# Patient Record
Sex: FEMALE | Race: WHITE
[De-identification: names, ages, dates, MRNs, and addresses within clinical notes are randomized per-mention and may not be internally consistent; named-entity substitution may affect disease eponyms.]

---

## 2018-02-13 ENCOUNTER — HOSPITAL ENCOUNTER (EMERGENCY)
Dept: HOSPITAL 42 - ED | Age: 33
Discharge: HOME | End: 2018-02-13
Payer: COMMERCIAL

## 2018-02-13 VITALS
RESPIRATION RATE: 18 BRPM | TEMPERATURE: 99.2 F | SYSTOLIC BLOOD PRESSURE: 96 MMHG | OXYGEN SATURATION: 98 % | HEART RATE: 97 BPM | DIASTOLIC BLOOD PRESSURE: 54 MMHG

## 2018-02-13 VITALS — BODY MASS INDEX: 28.5 KG/M2

## 2018-02-13 DIAGNOSIS — J11.1: Primary | ICD-10-CM

## 2018-02-13 NOTE — ED PDOC
Arrival/HPI





- General


Chief Complaint: Cough, Cold, Congestion


Time Seen by Provider: 18 05:10


Historian: Patient





- History of Present Illness


Narrative History of Present Illness (Text): 





18 05:26


33 year old female, with no significant past medical history, presents to the 

emergency department complaining of flu-like symptoms that began a few days 

ago. Patient reports having a dry cough, runny nose, body aches, and subjective 

fever. Patient denies any chills, chest pain, shortness of breath, nausea, 

vomiting, diarrhea, back pain, neck pain, headache, dizziness, or any other 

complaints. 








Time/Duration: Other (few days)


Symptom Onset: Gradual


Symptom Course: Unchanged


Activities at Onset: Light


Context: Home





Past Medical History





- Provider Review


Nursing Documentation Reviewed: Yes





- Cardiac


Hx Cardiac Disorders: No





- Pulmonary


Hx Respiratory Disorders: No





- Neurological


Hx Neurological Disorder: No





- HEENT


Hx HEENT Disorder: No





- Renal


Hx Renal Disorder: No





- Endocrine/Metabolic


Hx Endocrine Disorders: No





- Hematological/Oncological


Hx Blood Disorders: No





- Integumentary


Hx Dermatological Disorder: No





- Musculoskeletal/Rheumatological


Hx Musculoskeletal Disorders: No





- Gastrointestinal


Hx Gastrointestinal Disorders: No





- Genitourinary/Gynecological


Hx Genitourinary Disorders: No





- Psychiatric


Hx Anxiety: Yes


Hx Substance Use: No





- Surgical History


Hx  Section: Yes


Other/Comment: lump on left breast.





- Anesthesia


Hx Anesthesia: No


Hx Anesthesia Reactions: No


Hx Malignant Hyperthermia: No





- Suicidal Assessment


Feels Threatened In Home Enviroment: No





Family/Social History





- Physician Review


Nursing Documentation Reviewed: Yes


Family/Social History: No Known Family HX


Smoking Status: Never Smoked


Hx Alcohol Use: No


Hx Substance Use: No


Hx Substance Use Treatment: No





Allergies/Home Meds


Allergies/Adverse Reactions: 


Allergies





No Known Allergies Allergy (Verified 18 04:53)


 











Review of Systems





- Physician Review


All systems were reviewed & negative as marked: Yes





- Review of Systems


Constitutional: Fevers.  absent: Other (Chills)


ENT: Rhinorrhea


Respiratory: Cough.  absent: SOB


Cardiovascular: absent: Chest Pain


Gastrointestinal: absent: Diarrhea, Nausea, Vomiting


Musculoskeletal: Myalgias.  absent: Back Pain, Neck Pain


Neurological: absent: Headache, Dizziness





Physical Exam


Vital Signs Reviewed: Yes


Vital Signs











  Temp Pulse Resp BP Pulse Ox


 


 18 04:54  99.2 F  97 H  18  96/54 L  98











Temperature: Afebrile


Blood Pressure: Normal


Pulse: Regular


Respiratory Rate: Normal


Appearance: Positive for: Well-Appearing, Non-Toxic, Comfortable


Pain Distress: None


Mental Status: Positive for: Alert and Oriented X 3





- Systems Exam


Head: Present: Atraumatic, Normocephalic


Pupils: Present: PERRL


Extroacular Muscles: Present: EOMI


Conjunctiva: Present: Normal


Mouth: Present: Moist Mucous Membranes


Nose (Internal): Present: Rhinorrhea


Neck: Present: Normal Range of Motion.  No: Meningeal Signs


Respiratory/Chest: Present: Clear to Auscultation, Good Air Exchange.  No: 

Respiratory Distress, Accessory Muscle Use


Cardiovascular: Present: Regular Rate and Rhythm, Normal S1, S2.  No: Murmurs


Abdomen: Present: Normal Bowel Sounds.  No: Tenderness, Distention, Peritoneal 

Signs


Back: Present: Normal Inspection


Upper Extremity: Present: Normal Inspection.  No: Cyanosis, Edema


Lower Extremity: Present: Normal Inspection.  No: Edema


Neurological: Present: GCS=15, CN II-XII Intact, Speech Normal


Skin: Present: Warm, Dry, Normal Color.  No: Rashes


Psychiatric: Present: Alert, Oriented x 3, Normal Insight, Normal Concentration





Medical Decision Making


ED Course and Treatment: 





18 05:26


Impression:


33 year old female presents complaining of flu-like symptoms. Pt complaining of 

dry cough, rhinorrhea, subjective fever, and myalgia. 





Plan:


-- Tamiflu


-- Zithromax 


-- Reassess and disposition





Progress Notes:











- Medication Orders


Current Medication Orders: 











Discontinued Medications





Azithromycin (Zithromax)  500 mg PO ONCE STA


   PRN Reason: Protocol


   Stop: 18 05:31


Oseltamivir Phosphate (Tamiflu Cap)  75 mg PO ONCE ONE


   PRN Reason: Protocol


   Stop: 18 05:31











- Scribe Statement


The provider has reviewed the documentation as recorded by the Betsy Quesada





Provider Scribe Attestation:


All medical record entries made by the Scribe were at my direction and 

personally dictated by me. I have reviewed the chart and agree that the record 

accurately reflects my personal performance of the history, physical exam, 

medical decision making, and the department course for this patient. I have 

also personally directed, reviewed, and agree with the discharge instructions 

and disposition.








Disposition/Present on Arrival





- Present on Arrival


Any Indicators Present on Arrival: No


History of DVT/PE: No


History of Uncontrolled Diabetes: No


Urinary Catheter: No


History of Decub. Ulcer: No


History Surgical Site Infection Following: None





- Disposition


Have Diagnosis and Disposition been Completed?: Yes


Diagnosis: 


 Flu-like symptoms, Bronchitis





Disposition: HOME/ ROUTINE


Disposition Time: 05:32


Patient Plan: Discharge


Patient Problems: 


 Current Active Problems











Problem Status Onset


 


Bronchitis Acute  


 


Flu-like symptoms Acute  











Condition: GOOD


Discharge Instructions (ExitCare):  Influenza (ED), Acute Bronchitis (ED)


Additional Instructions: 


Rest/drink plenty of liquids/take meds as prescribed/follow up with your doctor 

this week


Prescriptions: 


Oseltamivir [Tamiflu] 75 mg PO BID #10 cap


Azithromycin [Zithromax] 250 mg PO DAILY #4 tab


Forms:  [facility] or [facility] (English)

## 2018-12-31 ENCOUNTER — HOSPITAL ENCOUNTER (INPATIENT)
Dept: HOSPITAL 14 - H.L&D | Age: 33
LOS: 3 days | Discharge: HOME | DRG: 371 | End: 2019-01-03
Attending: OBSTETRICS & GYNECOLOGY | Admitting: OBSTETRICS & GYNECOLOGY
Payer: COMMERCIAL

## 2018-12-31 VITALS — RESPIRATION RATE: 18 BRPM

## 2018-12-31 VITALS — BODY MASS INDEX: 35 KG/M2

## 2018-12-31 DIAGNOSIS — O34.211: Primary | ICD-10-CM

## 2018-12-31 DIAGNOSIS — O48.0: ICD-10-CM

## 2018-12-31 DIAGNOSIS — Z3A.40: ICD-10-CM

## 2018-12-31 LAB
BASOPHILS # BLD AUTO: 0 K/UL (ref 0–0.2)
BASOPHILS NFR BLD: 0.3 % (ref 0–2)
EOSINOPHIL # BLD AUTO: 0.1 K/UL (ref 0–0.7)
EOSINOPHIL NFR BLD: 0.5 % (ref 0–4)
ERYTHROCYTE [DISTWIDTH] IN BLOOD BY AUTOMATED COUNT: 14.8 % (ref 11.5–14.5)
HGB BLD-MCNC: 11.6 G/DL (ref 12–16)
LYMPHOCYTES # BLD AUTO: 1.6 K/UL (ref 1–4.3)
LYMPHOCYTES NFR BLD AUTO: 17.3 % (ref 20–40)
MCH RBC QN AUTO: 29.4 PG (ref 27–31)
MCHC RBC AUTO-ENTMCNC: 33.4 G/DL (ref 33–37)
MCV RBC AUTO: 87.8 FL (ref 81–99)
MONOCYTES # BLD: 0.7 K/UL (ref 0–0.8)
MONOCYTES NFR BLD: 7.1 % (ref 0–10)
NEUTROPHILS # BLD: 7.1 K/UL (ref 1.8–7)
NEUTROPHILS NFR BLD AUTO: 74.8 % (ref 50–75)
NRBC BLD AUTO-RTO: 0 % (ref 0–0)
PLATELET # BLD: 196 K/UL (ref 130–400)
PMV BLD AUTO: 11.4 FL (ref 7.2–11.7)
RBC # BLD AUTO: 3.95 MIL/UL (ref 3.8–5.2)
WBC # BLD AUTO: 9.5 K/UL (ref 4.8–10.8)

## 2018-12-31 PROCEDURE — 4A1HXCZ MONITORING OF PRODUCTS OF CONCEPTION, CARDIAC RATE, EXTERNAL APPROACH: ICD-10-PCS | Performed by: OBSTETRICS & GYNECOLOGY

## 2018-12-31 NOTE — OBADHP
===========================

Datetime: 2018 07:52

===========================

   

Admit Comment, IP Provider:  34 YO  with IUP at EGA 40.1 Wks EDC 18, who presents today 
to LD for an scheduled repeat , patient had first  in  due to breech presentat
ion. Patient today states feeling well, pa denies VB, LOF, CONTX, headaches, chest pain, abdominal pa
in, blurry vision, dizziness, N/V/D, dysuria, fever or chills. Patient reports +FM.

      

   ROS: unremarkable, except as per HPI.

   Prenatal provider: Dr Perez.

   OBGYN: Denies h/o STI, reports normal current pregnancy. Patient reports h/o of 2 miscarriages in 
the past.

   PMH: Denies

   FMH: Mother suffered a stroke.

   ALL: NKA

   SURG: Breast lumpectomy

   SOCHx: Denies Tob/ETOH/Drug use

   MEDS: Prenatal VIT

   LABS: 10/8 RPR neg, HIV neg, HB neg, 12/3 GBS neg. O+ Ab neg

      

   PE

   GEN: NAD

   HEENT: NCAT

   RESP: CTA b/l

   CV: RRR, S1 S2 present normal, no murmurs noted

   ABD: Gravid, soft, no tenderness

   EXT: No Edema

      

   A/P

   34 YO  with IUP at EGA 40.1 Wks EDC 18, presenting for scheduled repeat , wi
th h/o first  due to breech section.

   Plan

   -Admit to [facility]

   -Maternal VS monitoring

   -FHR Monitoring

   -CBC, Type and screen

      

   Case discussed with attending

   CARLOS Astorga MD PGY1

      

      

   The patient was seen with the resident I agree witht the note

Lungs - PN:  Normal

Heart - PN:  Normal

HEENT - PN:  Normal

General - PN:  Normal

Comments, ACOG Physical Exam:  see triage comments

IP Prenatal Hx Assessment:  The Prenatal History has been Reviewed and is Current

IP Chief Complaint:  Scheduled  Section

EGA AdmitDate IP:  40.1

IP Adm Impression:  Term, intrauterine pregnancy

IP Admit Plan:  Admit to unit; Initiate  Section protocol

## 2018-12-31 NOTE — OBDS
===================================

DELIVERY PERSONNEL

===================================

   

Delivery Doctor:  ELSIE Perez MD

Scrub Nurse:  Suzanne Mcconnell OBT

Circulator:  Rozina Batista RN

Anesthesiologist:  CRISTIN stark MD 

   

===================================

MATERNAL INFORMATION

===================================

   

Delivery Anesthesia:  Spinal

Medications in Delivery:  Pitocin

Estimated  Blood Loss (ml):  800

Placenta Cultured:  No

Maternal Complications:  None

RN Comments:  Atraumatic Repeat  delivery of a viable babygirl with Lusty cry Infant and Pat
ient tolerated delivery well.   Infant assigned 9/9 APGARs Skin to skin initiated 

Provider Comments:  see operative report

   

===================================

LABOR SUMMARY

===================================

   

EDC:  2018 00:00

No. Babies in Womb:  1

 Attempted:  No

Labor Anesthesia:  None

   

===================================

LABOR INFORMATION

===================================

   

Reason for Induction:  Not Applicable

Group B Beta Strep:  Negative

Antibiotics # of Doses:  1

Antibiotics Time of Last Dose:  915

Steroids Given:  None

Reason Steroids Not Administered:  Not Applicable

Other Reason Not Administered:  Not required

   

===================================

MEMBRANES

===================================

   

Membranes Rupture Method:  Artificial

Rupture of Membranes:  2018 09:42

Length of Rupture (hrs):  0.02

Amniotic Fluid Color:  Clear

Amniotic Fluid Amount:  Small

Amniotic Fluid Odor:  Normal

   

===================================

STAGES OF LABOR

===================================

   

Stage 3 hrs:  0

Stage 3 min:  1

   

===================================

CSECTION DELIVERY

===================================

   

Primary Indication:  Repeat Elective

CSection Urgency:  Elective

CSection Incidence:  Repeat

CSection Incision:  Lower Uterine Transverse

   

===================================

BABY A INFORMATION

===================================

   

Infant Delivery Date/Time:  2018 09:43

Method of Delivery:   (Annotations: Data stored by [facility] on behalf of user)

Born in Route :  No

:  N/A

Forceps:  N/A

Vacuum Extraction:  N/A

Shoulder Dystocia :  No

   

===================================

SHOULDER DYSTOCIA BABY A

===================================

   

Infant Delivery Date/Time:  2018 09:43

   

===================================

PRESENTATION/POSITION BABY A

===================================

   

Presentation:  Cephalic

Cephalic Presentation:  Vertex

Vertex Position:  Left Occipital Anterior

Breech Presentation:  N/A

   

===================================

PLACENTA INFORMATION BABY A

===================================

   

Placenta Delivery Time :  2018 09:44

Placenta Method of Delivery:  Spontaneous

Placenta Status:  Delivered

   

===================================

APGAR SCORES BABY A

===================================

   

Heart Rate 1 min:  >100 bpm

Resp Effort 1 min:  Good Cry

Reflex Irritability 1 min:  Cough or Sneeze or Pulls Away

Muscle Tone 1 min:  Active Motion

Color 1 min:  Body Pink, Extremities Blue

Resuscitation Effort 1 min:  N/A

APGAR SCORE 1 MIN:  9

Heart Rate 5 min:  >100 bpm

Resp Effort 5 min:  Good Cry

Reflex Irritability 5 min:  Cough or Sneeze or Pulls Away

Muscle Tone 5 min:  Active Motion

Color 5 min:  Body Pink, Extremities Blue

Resuscitation Effort 5 min:  N/A

APGAR SCORE 5 MIN:  9

   

===================================

INFANT INFORMATION BABY A

===================================

   

Gestational Age at Delivery:  40.0

Gestational Status:  Term (Annotations: Data stored by [facility] on behalf of user)

Infant Outcome :  Liveborn

Infant Condition :  Stable

Infant Sex:  Female

   

===================================

IDENTIFICATION/MEDS BABY A

===================================

   

ID Band Number:  [id number]

ID Band Location:  Left Leg; Left Arm



   

===================================

WEIGHT/LENGTH BABY A

===================================

   

Infant Birthweight (gms):  3290

Infant Weight (lb):  7

Infant Weight (oz):  4

Infant Length Inches:  19.50

Infant Length cms:  49.5

   

===================================

CORD INFORMATION BABY A

===================================

   

No. Cord Vessels:  3

Nuchal Cord :  N/A

Cord Blood Taken:  N/A

Infant Suction:  Mouth

   

===================================

ASSESSMENT BABY A

===================================

   

Infant Complications:  None

Physical Findings at Delivery:  Within Normal Limits

Infant Respirations:  Appears Normal

Neonatologist/ALS Called :  No

Infant Care By:  Dr Curiel

Transferred To:  Remains with Mother

## 2018-12-31 NOTE — OP
PROCEDURE DATE:  2018



PREOPERATIVE DIAGNOSES:  Intrauterine pregnancy at 40 weeks, history of

previous  delivery.



POSTOPERATIVE DIAGNOSES:  Intrauterine pregnancy at 40 weeks, history of

previous  delivery.



OPERATION PERFORMED:  Repeat low flap transverse  section via

Pfannenstiel skin incision.



SURGEON:  Crow Perez MD



ASSISTANT:  Lex Calvert DO.  Dr. Calvert was instrumental in the care of the

patient.  He helped to create exposure.  He was helpful in obtaining

hemostasis, helpful in delivering the infant and closure of the patient. 

The procedure would not have been possible without his assistance.



ANESTHESIA:  Spinal.



ANESTHESIA ADMINISTERED BY:  Lilo Mayen MD



ESTIMATED BLOOD LOSS:  800 mL.



URINE OUTPUT:  Enriquez catheter put out approximately 200 mL of clear urine.



INTRAVENOUS FLUIDS:  The patient received 1400 mL of D5 LR

intraoperatively.



FINDINGS:  The operative findings were baby girl, Apgars 9 and 9, vertex

presentation, weighing 3290 g.  Normal uterus, tubes, and ovaries were

identified.



DESCRIPTION OF PROCEDURE:  After informed consent was obtained, the patient

was taken to the operating room where she was given spinal anesthesia.  She

was prepped and draped in a normal sterile fashion with a leftward tilt.  A

Pfannenstiel skin incision was then made with the scalpel and carried down

to the underlying layer of fascia.  The fascia was nicked in the midline. 

The fascial incision was then extended laterally with a curved Cochran

scissors.  The superior aspect of the fascial incision was then grasped

with Kocher clamps, elevated up, and the rectus muscles were dissected off

using both sharp and blunt dissections.  Attention was then turned to the

inferior aspect of the fascial incision which in a similar fashion was

grasped with Kocher clamps, elevated up, and the rectus muscles were

dissected off using both sharp and blunt dissections.  The rectus muscles

were then  in the midline.  The peritoneum was identified and

entered sharply with the Metzenbaum scissors.  The peritoneal incision was

then extended superiorly and inferiorly with good visualization of the

bladder.  The bladder blade was inserted and vesicouterine peritoneum

identified and entered sharply with the Metzenbaum scissors.  The incision

was then extended laterally and the bladder flap was created digitally. 

The bladder blade was then readjusted and a low-transverse incision was

made with the scalpel.  The uterine incision was then extended laterally

with bandage scissors.  The infant's head was then delivered

atraumatically.  The nose and mouth were suctioned with DeLee suction trap.

The cord was clamped and cut.  The infant was handed off to awaiting

pediatricians.  Cord blood was obtained.  The placenta was then removed

manually.  The uterus was exteriorized and cleared of all clots and debris.

The uterine incision was repaired with 0 Vicryl in a running locked

fashion.  The second layer of the same suture was used to obtain excellent

hemostasis.  The uterus was returned to the abdomen.  The abdomen was then

copiously irrigated.  The irrigant was removed with the suction device. 

The gutters were cleared of all clots and debris.  The uterine incision was

then re-examined and noted to be hemostatic.  The peritoneum was then

closed with 2-0 Vicryl in a running fashion.  The muscle was reapproximated

with 0 Vicryl in an interrupted fashion.  The fascia was closed with 0

Vicryl in a running fashion.  Then, the skin was closed with a 3-0 on a

You needle.  All sponge, lap, needle, and instrument counts were correct

x2, and the patient was taken to the recovery room in awake and stable

condition.





__________________________________________

Crow Perez MD





DD:  2018 10:32:57

DT:  2018 14:02:25

Job # [id number]

## 2018-12-31 NOTE — OBHP
===========================

Datetime: 2018 07:52

===========================

   

IP Adm Impression:  Term, intrauterine pregnancy

IP Admit Plan:  Admit to unit; Initiate  Section protocol

Admit Comment, IP Provider:  32 YO  with IUP at EGA 40.1 Wks EDC 18, who presents today 
to L_D for an scheduled repeat , patient had first  in  due to breech presentat
ion. Patient today states feeling well, pa denies VB, LOF, CONTX, headaches, chest pain, abdominal pa
in, blurry vision, dizziness, N/V/D, dysuria, fever or chills. Patient reports +FM.

      

   ROS: unremarkable, except as per HPI.

   Prenatal provider: Dr Perez.

   OBGYN: Denies h/o STI, reports normal current pregnancy. Patient reports h/o of 2 miscarriages in 
the past.

   PMH: Denies

   FMH: Mother suffered a stroke.

   ALL: NKA

   SURG: Breast lumpectomy

   SOCHx: Denies Tob/ETOH/Drug use

   MEDS: Prenatal VIT

   LABS: 10/8 RPR neg, HIV neg, HB neg, 12/3 GBS neg. O+ Ab neg

      

   PE

   GEN: NAD

   HEENT: NCAT

   RESP: CTA b/l

   CV: RRR, S1 S2 present normal, no murmurs noted

   ABD: Gravid, soft, no tenderness

   EXT: No Edema

      

   A/P

   32 YO  with IUP at EGA 40.1 Wks EDC 18, presenting for scheduled repeat , wi
th h/o first  due to breech section.

   Plan

   -Admit to LD

   -Maternal VS monitoring

   -FHR Monitoring

   -CBC, Type and screen

      

   Case discussed with attending

   CARLOS Astorga MD PGY1

      

      

   The patient was seen with the resident I agree witht the note

Lungs - PN:  Normal

Heart - PN:  Normal

HEENT - PN:  Normal

General - PN:  Normal

Comments, ACOG Physical Exam:  see triage comments

IP Prenatal Hx Assessment:  The Prenatal History has been Reviewed and is Current

EGA AdmitDate IP:  40.1

IP Chief Complaint:  Scheduled  Section

## 2019-01-01 LAB
ERYTHROCYTE [DISTWIDTH] IN BLOOD BY AUTOMATED COUNT: 15 % (ref 11.5–14.5)
HGB BLD-MCNC: 9.5 G/DL (ref 12–16)
MCH RBC QN AUTO: 28.8 PG (ref 27–31)
MCHC RBC AUTO-ENTMCNC: 32.8 G/DL (ref 33–37)
MCV RBC AUTO: 87.9 FL (ref 81–99)
PLATELET # BLD: 160 K/UL (ref 130–400)
RBC # BLD AUTO: 3.31 MIL/UL (ref 3.8–5.2)
WBC # BLD AUTO: 8.8 K/UL (ref 4.8–10.8)

## 2019-01-01 RX ADMIN — MULTIPLE VITAMINS W/ MINERALS TAB SCH TAB: TAB at 09:01

## 2019-01-01 NOTE — OBPPN
===========================

Datetime: 01/01/2019 07:34

===========================

   

PP Pain Prov:  Within normal limits

PP Nausea Prov:  Denies

PP Flatus Prov:  No

PP BM Prov:  No

PP Abdomen/Uterus Prov:  Normal

PP Lochia Prov:  Normal

PP Extremities Prov:  Normal

PP C/S Incision Prov:  Normal

PP Breastfeeding Progress Prov:  Normal

PP Comments Phys Exam Prov:  Incision w/ bandage in place 

PP Impression Prov:  Normal postpartum progression

PP Plan Prov:  Continue present management

PP Progress Note Prov:  POD 1 s/p Repeat c/s, doing well, breast and bottle feeding

   Continue current care 

Vital Signs Provider PP:  Reviewed

## 2019-01-02 LAB — RUBV IGG SERPL IA-ACNC: <0.9 INDEX

## 2019-01-02 RX ADMIN — MULTIPLE VITAMINS W/ MINERALS TAB SCH TAB: TAB at 08:45

## 2019-01-03 VITALS
SYSTOLIC BLOOD PRESSURE: 104 MMHG | TEMPERATURE: 98.8 F | DIASTOLIC BLOOD PRESSURE: 70 MMHG | HEART RATE: 86 BPM | OXYGEN SATURATION: 98 %

## 2019-01-03 RX ADMIN — MULTIPLE VITAMINS W/ MINERALS TAB SCH TAB: TAB at 09:09

## 2019-01-03 NOTE — OBPPN
===========================

Datetime: 01/03/2019 10:50

===========================

   

PP Pain Prov:  Within normal limits

PP Nausea Prov:  Denies

PP Flatus Prov:  Yes

PP BM Prov:  Yes

PP Breasts Prov:  Normal

PP Heart Prov:  Normal

PP Lungs Prov:  Normal

PP Abdomen/Uterus Prov:  Normal

PP Lochia Prov:  Normal

PP Vulva/Perineum Prov:  Normal

PP CVA Tenderness Prov:  Normal

PP Extremities Prov:  Normal

PP Comments Phys Exam Prov:  Fundus firm under umbilicus

   Incision clean/dry/intact

PP Impression Prov:  Normal postpartum progression

PP Plan Prov:  Continue present management; Discharge

PP Progress Note Prov:  Patient denies CP, no SOB, no N/V, tolerating PO diet, ambulating/voiding wel
l, mild lochia, abdominal pain tolerable with meds

      

   A/p

   1. Patient for discharge today

   2. Discharge instructions reviewed

IP PP Procedures:  None

Vital Signs Provider PP:  Reviewed; Within Normal Limits

## 2019-01-03 NOTE — OBDCSUM
===========================

Datetime: 01/03/2019 10:38

===========================

   

Discharged to, Provider:  Home

Follow up at, Provider:  [facility] office

Disch Instr Activity:  Normal activity

Disch Instr Diet:  Regular

Discharge Instructions, Provider:  Routine instructions given

Discharge Diagnosis, Provider:  Term Pregnancy Delivered

Discharge Time:  01/03/2019 10:51

Follow up in weeks, Provider:  in 1 week and 4-6 weeks.

Disch Referrals:  None

Contraception discussed, Prov:  No

Disch Activity Restrictions:  No exercising; Minimize stair-climbing; Nothing in vagina - [place]
, tampons, douche